# Patient Record
Sex: MALE | Race: BLACK OR AFRICAN AMERICAN | NOT HISPANIC OR LATINO | Employment: UNEMPLOYED | URBAN - METROPOLITAN AREA
[De-identification: names, ages, dates, MRNs, and addresses within clinical notes are randomized per-mention and may not be internally consistent; named-entity substitution may affect disease eponyms.]

---

## 2022-06-01 ENCOUNTER — HOSPITAL ENCOUNTER (EMERGENCY)
Facility: HOSPITAL | Age: 27
Discharge: HOME | End: 2022-06-01
Attending: EMERGENCY MEDICINE | Admitting: EMERGENCY MEDICINE

## 2022-06-01 VITALS
HEIGHT: 71 IN | WEIGHT: 165 LBS | BODY MASS INDEX: 23.1 KG/M2 | DIASTOLIC BLOOD PRESSURE: 82 MMHG | OXYGEN SATURATION: 98 % | TEMPERATURE: 97 F | SYSTOLIC BLOOD PRESSURE: 137 MMHG | RESPIRATION RATE: 16 BRPM | HEART RATE: 81 BPM

## 2022-06-01 DIAGNOSIS — K12.2 UVULITIS: Primary | ICD-10-CM

## 2022-06-01 LAB — S PYO AG THROAT QL: NEGATIVE

## 2022-06-01 PROCEDURE — 96372 THER/PROPH/DIAG INJ SC/IM: CPT

## 2022-06-01 PROCEDURE — 87081 CULTURE SCREEN ONLY: CPT | Performed by: PHYSICIAN ASSISTANT

## 2022-06-01 PROCEDURE — 99283 EMERGENCY DEPT VISIT LOW MDM: CPT

## 2022-06-01 PROCEDURE — 87880 STREP A ASSAY W/OPTIC: CPT | Performed by: PHYSICIAN ASSISTANT

## 2022-06-01 PROCEDURE — 25010000002 DEXAMETHASONE PER 1 MG: Performed by: PHYSICIAN ASSISTANT

## 2022-06-01 RX ORDER — METHYLPREDNISOLONE 4 MG/1
TABLET ORAL
Qty: 1 EACH | Refills: 0 | Status: SHIPPED | OUTPATIENT
Start: 2022-06-01

## 2022-06-01 RX ORDER — DEXAMETHASONE SODIUM PHOSPHATE 10 MG/ML
10 INJECTION INTRAMUSCULAR; INTRAVENOUS ONCE
Status: COMPLETED | OUTPATIENT
Start: 2022-06-01 | End: 2022-06-01

## 2022-06-01 RX ORDER — AMOXICILLIN 875 MG/1
875 TABLET, COATED ORAL 2 TIMES DAILY
Qty: 14 TABLET | Refills: 0 | Status: SHIPPED | OUTPATIENT
Start: 2022-06-01 | End: 2022-06-08

## 2022-06-01 RX ADMIN — DEXAMETHASONE SODIUM PHOSPHATE 10 MG: 10 INJECTION, SOLUTION INTRAMUSCULAR; INTRAVENOUS at 12:57

## 2022-06-01 NOTE — DISCHARGE INSTRUCTIONS
Eat and drink cold things like popsicles, milk shakes, ice water - This can help with the pain and swelling.  You can take Tylenol and ibuprofen as needed for pain.  Follow package instructions  Start the steroid pack tomorrow  Return to the ER as needed

## 2022-06-01 NOTE — ED NOTES
This RN wearing all appropriate PPE during patient encounter. Hand hygiene performed before and during entering room.

## 2022-06-01 NOTE — ED NOTES
Pt arrives via PV. Pt speaks Lao. Unable to get in contact with  at this time. Pt states something is wrong with his tongue. No oral swelling noted at time of triage.    Patient wearing mask during triage. RN wearing appropriate PPE during triage. Hand hygiene performed.

## 2022-06-01 NOTE — ED PROVIDER NOTES
MD ATTESTATION NOTE    The JENISE and I have discussed this patient's history, physical exam, and treatment plan.  I have reviewed the documentation and personally had a face to face interaction with the patient. I affirm the documentation and agree with the treatment and plan.  The attached note describes my personal findings.      I provided a substantive portion of the care of the patient.  I personally performed the physical exam in its entirety, and below are my findings.  For this patient encounter, the patient wore surgical mask, I wore full protective PPE including N95 and eye protection    Brief HPI: 27-year-old male who states that he noticed he had swelling in the back of his throat today and thus came to the emergency room.  The patient does not speak English and thus I used the  phone for the history.  The patient denies fevers, chills, trouble swallowing, trouble breathing, trouble talking or recent illness.  He denies allergies or past medical history.    General : 27-year-old patient is awake alert and oriented  HEENT: NCAT: The patient has moderate edema of his uvula but no oropharyngeal swelling, lymphadenopathy, exudates, stridor or difficulty tolerating secretions  Ext: No acute abnormalities  Skin: No rash  Neuro: Cranial nerves II through XII grossly intact as tested.  No acute lateralizing deficits.  Psych: Normal mood and affect      Plan: The patient has uvular edema and we will treat him with antibiotics and steroids and outpatient follow-up.  I have explained this to the patient via the  phone and he understands and agrees with the plan.       Bill Almanzar MD  06/01/22 6971

## 2022-06-01 NOTE — ED PROVIDER NOTES
EMERGENCY DEPARTMENT ENCOUNTER    Room Number:  06/06  Date seen:  6/1/2022  Time seen: 12:12 EDT  PCP: Provider, No Known  Historian: patient      HPI:  Chief Complaint: sore throat    A complete HPI/ROS/PMH/PSH/SH/FH are unobtainable due to: none    Context: Gustavo Brown is a 27 y.o. male who presents to the ED for evaluation of sore throat upon waking this morning.  He states he has something swollen in the back of his throat.  It hurts to swallow, moderate in severity, nothing really makes it better.  He has not taken anything for his symptoms.  Denies any fever chills cough congestion or other upper respiratory symptoms.  Denies any medical problems or daily medications.    [employer]  ID number [id number] utilized to collect the history and physical exam.    PAST MEDICAL HISTORY  Active Ambulatory Problems     Diagnosis Date Noted   • No Active Ambulatory Problems     Resolved Ambulatory Problems     Diagnosis Date Noted   • No Resolved Ambulatory Problems     No Additional Past Medical History         PAST SURGICAL HISTORY  History reviewed. No pertinent surgical history.      FAMILY HISTORY  History reviewed. No pertinent family history.      SOCIAL HISTORY  Social History     Socioeconomic History   • Marital status: Single   Tobacco Use   • Smoking status: Never Smoker   Substance and Sexual Activity   • Alcohol use: Not Currently     Comment: on occasion   • Drug use: Never         ALLERGIES  Patient has no known allergies.        REVIEW OF SYSTEMS  Review of Systems   All systems reviewed and negative except for those discussed in HPI.       PHYSICAL EXAM  ED Triage Vitals   Temp Heart Rate Resp BP SpO2   06/01/22 1107 06/01/22 1107 06/01/22 1107 06/01/22 1127 06/01/22 1107   97 °F (36.1 °C) 81 16 137/82 98 %      Temp src Heart Rate Source Patient Position BP Location FiO2 (%)   06/01/22 1107 06/01/22 1107 06/01/22 1127 06/01/22 1127 --   Tympanic Monitor Sitting Right arm           GENERAL: not distressed  HENT: atraumatic, normocephalic.  Posterior oropharynx is clear and moist, no erythema.  Uvula is focally edematous.  There is no trismus, midline shift or tonsillar enlargement or exudate.  No retropharyngeal abscess or peritonsillar abscess.  No cervical adenopathy appreciated.  normal phonation.  TMs normal.  EYES: no scleral icterus, PERRL, extraocular movements intact  CV: regular rhythm, regular rate  RESPIRATORY: normal effort CTA B  ABDOMEN: Nondistended  MUSCULOSKELETAL: no deformity  NEURO: alert, moves all extremities, follows commands  SKIN: warm, dry    Vital signs and nursing notes reviewed.          LAB RESULTS  Recent Results (from the past 24 hour(s))   Rapid Strep A Screen - Swab, Throat    Collection Time: 06/01/22 12:54 PM    Specimen: Throat; Swab   Result Value Ref Range    Strep A Ag Negative Negative       Ordered the above labs and independently reviewed the results.        PROCEDURES  Procedures        MEDICATIONS GIVEN IN ER  Medications   dexamethasone (DECADRON) injection 10 mg (10 mg Intramuscular Given 6/1/22 1257)             PROGRESS AND CONSULTS    DDX includes but not limited to viral pharyngitis, bacterial pharyngitis, uvulitis    ED Course as of 06/01/22 1555   Wed Jun 01, 2022   1343 Strep A Ag: Negative [KA]   1343 Patient has uvulitis, no evidence of RPA or PTA.  Normal phonation, no trismus.  Nothing to suggest more worrisome problem.  He has been given steroids and antibiotics and is stable for discharge. [KA]      ED Course User Index  [KA] Jagruti Sprague PA             Patient was placed in face mask in first look. Patient was wearing facemask each time I entered the room and throughout our encounter. I wore protective equipment throughout this patient encounter including a face mask, eye shield and gloves. Hand hygiene was performed before donning protective equipment and after removal when leaving the room.        DIAGNOSIS  Final  diagnoses:   Uvulitis         Follow Up:  PATIENT CONNECTION - [facility] [id number]  [phone]  Schedule an appointment as soon as possible for a visit in 1 week        RX:     Medication List      New Prescriptions    amoxicillin 875 MG tablet  Commonly known as: AMOXIL  Take 1 tablet by mouth 2 (Two) Times a Day for 7 days.     methylPREDNISolone 4 MG dose pack  Commonly known as: MEDROL  Take as directed on package instructions. Begin on 06/02/2021           Where to Get Your Medications      You can get these medications from any pharmacy    Bring a paper prescription for each of these medications  · amoxicillin 875 MG tablet  · methylPREDNISolone 4 MG dose pack           Latest Documented Vital Signs:  As of 15:55 EDT  BP- 137/82 HR- 81 Temp- 97 °F (36.1 °C) (Tympanic) O2 sat- 98%       Jagruti Sprague PA  06/01/22 [id number]

## 2022-06-03 LAB — BACTERIA SPEC AEROBE CULT: NORMAL

## 2022-08-14 ENCOUNTER — HOSPITAL ENCOUNTER (EMERGENCY)
Facility: HOSPITAL | Age: 27
Discharge: HOME | End: 2022-08-14
Attending: EMERGENCY MEDICINE | Admitting: EMERGENCY MEDICINE

## 2022-08-14 VITALS
OXYGEN SATURATION: 100 % | BODY MASS INDEX: 28.39 KG/M2 | WEIGHT: 198.3 LBS | DIASTOLIC BLOOD PRESSURE: 84 MMHG | RESPIRATION RATE: 16 BRPM | TEMPERATURE: 96.7 F | HEIGHT: 70 IN | HEART RATE: 77 BPM | SYSTOLIC BLOOD PRESSURE: 144 MMHG

## 2022-08-14 DIAGNOSIS — Z91.89 AT RISK FOR SEXUALLY TRANSMITTED DISEASE DUE TO PARTNER WITH HIV: ICD-10-CM

## 2022-08-14 DIAGNOSIS — Z20.6 HIV EXPOSURE FROM BODY FLUIDS: Primary | ICD-10-CM

## 2022-08-14 LAB
BILIRUB UR QL STRIP: NEGATIVE
CLARITY UR: CLEAR
COLOR UR: YELLOW
GLUCOSE UR STRIP-MCNC: NEGATIVE MG/DL
HGB UR QL STRIP.AUTO: NEGATIVE
HIV1+2 AB SER QL: NORMAL
KETONES UR QL STRIP: ABNORMAL
LEUKOCYTE ESTERASE UR QL STRIP.AUTO: NEGATIVE
NITRITE UR QL STRIP: NEGATIVE
PH UR STRIP.AUTO: 5.5 [PH] (ref 5–8)
PROT UR QL STRIP: ABNORMAL
SP GR UR STRIP: 1.03 (ref 1–1.03)
UROBILINOGEN UR QL STRIP: ABNORMAL

## 2022-08-14 PROCEDURE — G0432 EIA HIV-1/HIV-2 SCREEN: HCPCS | Performed by: PHYSICIAN ASSISTANT

## 2022-08-14 PROCEDURE — 96372 THER/PROPH/DIAG INJ SC/IM: CPT

## 2022-08-14 PROCEDURE — 87591 N.GONORRHOEAE DNA AMP PROB: CPT | Performed by: PHYSICIAN ASSISTANT

## 2022-08-14 PROCEDURE — 81003 URINALYSIS AUTO W/O SCOPE: CPT | Performed by: PHYSICIAN ASSISTANT

## 2022-08-14 PROCEDURE — 99283 EMERGENCY DEPT VISIT LOW MDM: CPT

## 2022-08-14 PROCEDURE — 25010000002 CEFTRIAXONE PER 250 MG: Performed by: PHYSICIAN ASSISTANT

## 2022-08-14 PROCEDURE — 87491 CHLMYD TRACH DNA AMP PROBE: CPT | Performed by: PHYSICIAN ASSISTANT

## 2022-08-14 RX ORDER — EMTRICITABINE AND TENOFOVIR DISOPROXIL FUMARATE 200; 300 MG/1; MG/1
1 TABLET, FILM COATED ORAL DAILY
Qty: 30 TABLET | Refills: 0 | Status: SHIPPED | OUTPATIENT
Start: 2022-08-14

## 2022-08-14 RX ORDER — DOLUTEGRAVIR SODIUM 50 MG/1
50 TABLET, FILM COATED ORAL DAILY
Qty: 30 TABLET | Refills: 0 | Status: SHIPPED | OUTPATIENT
Start: 2022-08-14

## 2022-08-14 RX ORDER — AZITHROMYCIN 250 MG/1
1000 TABLET, FILM COATED ORAL ONCE
Status: COMPLETED | OUTPATIENT
Start: 2022-08-14 | End: 2022-08-14

## 2022-08-14 RX ADMIN — LIDOCAINE HYDROCHLORIDE 490 MG: 10 INJECTION, SOLUTION EPIDURAL; INFILTRATION; INTRACAUDAL; PERINEURAL at 13:43

## 2022-08-14 RX ADMIN — AZITHROMYCIN DIHYDRATE 1000 MG: 250 TABLET, FILM COATED ORAL at 13:38

## 2022-08-14 NOTE — ED PROVIDER NOTES
I supervised care provided by the midlevel provider.   We have discussed this patient's history, physical exam, and treatment plan.  I have reviewed the note and personally saw and examined the patient and agree with the plan of care.   Patient comes here for treatment for postexposure prophylaxis.  This gentleman does not speak English and Tanya matthews obtained history via .  He had sexual intercourse with a condom yesterday with the patient which was HIV positive.  The condom broke and he came here for treatment to prevent HIV.  In conversations with the  Tanya did inform me that he was asymptomatic and had no complaints and he also wanted treatment to prevent any other sexually transmitted disease.    GENERAL: not distressed male sitting up in bed  HENT: nares patent  Head/neck/ face are symmetric without gross deformity or swelling  EYES: no scleral icterus  CV: regular rhythm, regular rate with intact distal pulses  RESPIRATORY: normal effort and no respiratory distress  ABDOMEN: soft and nontender  MUSCULOSKELETAL: no deformity  NEURO: alert and appropriate, moves all extremities, follows commands  SKIN: warm, dry    Vital signs and nursing notes reviewed.    Plan we will go ahead and treat him for prophylaxis against gonorrhea, chlamydia, HIV.    We are currently under a pandemic from the COVID19 infection.  The patient presented to the emergency department by ambulance or personal vehicle. I followed the current protocols required by Infection Control at [facility] in my evaluation and treatment of the patient. The patient was wearing a face mask during my evaluation and throughout my encounter. During my whole encounter with this patient I used appropriate personal protective equipment.  This equipment consisted of eye protection, facemask, gown, and gloves.  I applied this equipment before entering the room.           Jaiden Hopkins MD  08/14/22 8125

## 2022-08-14 NOTE — DISCHARGE INSTRUCTIONS
No sexual contact with anyone for 1 week.  Your HIV test today was negative, but you will need follow up testing routinely for up to one year because of your exposure. You can do this with a primary care clinic.

## 2022-08-14 NOTE — ED TRIAGE NOTES
Pt had sex c someone last night and wants to be tested for std    Patient was placed in face mask during first look triage.  Patient was wearing a face mask throughout encounter.  I wore personal protective equipment throughout the encounter.  Hand hygiene was performed before and after patient encounter.

## 2022-08-14 NOTE — ED PROVIDER NOTES
EMERGENCY DEPARTMENT ENCOUNTER    Room Number:  04/04  Date seen:  8/14/2022  Time seen: 12:49 EDT  PCP: Provider, No Known  Historian: patient      HPI:t  Chief Complaint: possible STI exposure    A complete HPI/ROS/PMH/PSH/SH/FH are unobtainable due to: patient    Context: Gustavo Brown is a 27 y.o. male who presents to the ED for evaluation of a possible STI exposure that occurred last night.  He states he was having sex with an HIV-positive partner and the condom he was using broke.  He is asymptomatic and presents for postexposure prophylaxis.  No history of HIV in himself, denies any urinary symptoms, genital lesions or penile discharge.  He is unsure of any other STD exposures and is interested in postexposure prophylaxis for those as well.      [employer]  ID [id number] utilized to collect the history and physical    PAST MEDICAL HISTORY  Active Ambulatory Problems     Diagnosis Date Noted   • No Active Ambulatory Problems     Resolved Ambulatory Problems     Diagnosis Date Noted   • No Resolved Ambulatory Problems     No Additional Past Medical History         PAST SURGICAL HISTORY  History reviewed. No pertinent surgical history.      FAMILY HISTORY  History reviewed. No pertinent family history.      SOCIAL HISTORY  Social History     Socioeconomic History   • Marital status: Single   Tobacco Use   • Smoking status: Never Smoker   • Smokeless tobacco: Never Used   Substance and Sexual Activity   • Alcohol use: Not Currently     Comment: on occasion   • Drug use: Never         ALLERGIES  Patient has no known allergies.        REVIEW OF SYSTEMS  Review of Systems     All systems reviewed and negative except for those discussed in HPI.       PHYSICAL EXAM  ED Triage Vitals   Temp Heart Rate Resp BP SpO2   08/14/22 1212 08/14/22 1212 08/14/22 1212 08/14/22 1238 08/14/22 1212   96.7 °F (35.9 °C) 77 16 128/75 100 %      Temp src Heart Rate Source Patient Position BP Location FiO2 (%)   08/14/22  1212 08/14/22 1212 08/14/22 1238 08/14/22 1238 --   Tympanic Monitor Sitting Right arm          GENERAL: not distressed  HENT: atraumatic  EYES: no scleral icterus  CV: regular rhythm, regular rate  RESPIRATORY: normal effort CTA B nondistended  ABDOMEN: soft, nontender  MUSCULOSKELETAL: no deformity  NEURO: alert, moves all extremities, follows commands  SKIN: warm, dry    Vital signs and nursing notes reviewed.          LAB RESULTS  Recent Results (from the past 24 hour(s))   Urinalysis With Microscopic If Indicated (No Culture) - Urine, Clean Catch    Collection Time: 08/14/22 12:34 PM    Specimen: Urine, Clean Catch   Result Value Ref Range    Color, UA Yellow Yellow, Straw    Appearance, UA Clear Clear    pH, UA 5.5 5.0 - 8.0    Specific Gravity, UA 1.027 1.005 - 1.030    Glucose, UA Negative Negative    Ketones, UA Trace (A) Negative    Bilirubin, UA Negative Negative    Blood, UA Negative Negative    Protein, UA Trace (A) Negative    Leuk Esterase, UA Negative Negative    Nitrite, UA Negative Negative    Urobilinogen, UA 1.0 E.U./dL 0.2 - 1.0 E.U./dL   HIV-1 / O / 2 Ag / Antibody 4th Generation    Collection Time: 08/14/22  1:16 PM    Specimen: Blood   Result Value Ref Range    HIV-1/ HIV-2 Non-Reactive Non-Reactive       Ordered the above labs and independently reviewed the results.        PROCEDURES  Procedures        MEDICATIONS GIVEN IN ER  Medications   cefTRIAXone (ROCEPHIN) 350 mg/ml in lidocaine 1% IM syringe (500 mg vial) (490 mg Intramuscular Given 8/14/22 1343)   azithromycin (ZITHROMAX) tablet 1,000 mg (1,000 mg Oral Given 8/14/22 1338)             PROGRESS AND CONSULTS        ED Course as of 08/14/22 1629   Sun Aug 14, 2022   1309 I have also offered the patient prophylaxis for chlamydia and gonorrhea which he would like to have ordered Rocephin and Zithromax. [KA]   1436 HIV-1/ HIV-2 Ab: Non-Reactive [KA]      ED Course User Index  [KA] Jagruti Sprague PA             Patient was placed in face  mask in first look. Patient was wearing facemask each time I entered the room and throughout our encounter. I wore protective equipment throughout this patient encounter including a face mask, eye shield and gloves. Hand hygiene was performed before donning protective equipment and after removal when leaving the room.        DIAGNOSIS  Final diagnoses:   HIV exposure from body fluids   At risk for sexually transmitted disease due to partner with HIV         Follow Up:  No follow-up provider specified.    RX:     Medication List      New Prescriptions    emtricitabine-tenofovir 200-300 MG per tablet  Commonly known as: Truvada  Take 1 tablet by mouth Daily.     Tivicay 50 MG tablet  Generic drug: dolutegravir  Take 1 tablet by mouth Daily.           Where to Get Your Medications      These medications were sent to [facility] [place] [id number]    Hours: 7:00 AM-6:00 PM Mon-Fri, 8:00 AM-4:30 PM Sat-Sun (Closed 12-12:30PM) Phone: [phone]   · emtricitabine-tenofovir 200-300 MG per tablet  · Tivicay 50 MG tablet           Latest Documented Vital Signs:  As of 16:29 EDT  BP- 144/84 HR- 77 Temp- 96.7 °F (35.9 °C) (Tympanic) O2 sat- 100%       Jagruti Sprague PA  08/14/22 [id number]

## 2022-08-14 NOTE — ED NOTES
Patient ambulatory on discharge. Discharge instructions provided. Patient verbalized understanding of discharge instructions and medication administration. Patient is Aox4 with no acute distress noted. VSS

## 2022-08-17 LAB
C TRACH RRNA SPEC QL NAA+PROBE: NEGATIVE
N GONORRHOEA RRNA SPEC QL NAA+PROBE: NEGATIVE